# Patient Record
Sex: MALE | Race: WHITE | NOT HISPANIC OR LATINO | URBAN - METROPOLITAN AREA
[De-identification: names, ages, dates, MRNs, and addresses within clinical notes are randomized per-mention and may not be internally consistent; named-entity substitution may affect disease eponyms.]

---

## 2022-05-14 ENCOUNTER — HOSPITAL ENCOUNTER (EMERGENCY)
Facility: OTHER | Age: 32
Discharge: HOME | End: 2022-05-14
Attending: EMERGENCY MEDICINE
Payer: COMMERCIAL

## 2022-05-14 VITALS
OXYGEN SATURATION: 99 % | WEIGHT: 200 LBS | TEMPERATURE: 99 F | BODY MASS INDEX: 28.63 KG/M2 | HEIGHT: 70 IN | HEART RATE: 85 BPM | RESPIRATION RATE: 18 BRPM | SYSTOLIC BLOOD PRESSURE: 139 MMHG | DIASTOLIC BLOOD PRESSURE: 80 MMHG

## 2022-05-14 DIAGNOSIS — S00.81XA ABRASION OF FACE, INITIAL ENCOUNTER: Primary | ICD-10-CM

## 2022-05-14 DIAGNOSIS — S00.511A ABRASION OF LIP, INITIAL ENCOUNTER: ICD-10-CM

## 2022-05-14 DIAGNOSIS — S03.2XXA TOOTH AVULSION, INITIAL ENCOUNTER: ICD-10-CM

## 2022-05-14 PROCEDURE — 99285 EMERGENCY DEPT VISIT HI MDM: CPT | Mod: 25

## 2022-05-14 PROCEDURE — 64400 NJX AA&/STRD TRIGEMINAL NRV: CPT | Mod: RT

## 2022-05-14 RX ORDER — KETOROLAC TROMETHAMINE 10 MG/1
10 TABLET, FILM COATED ORAL EVERY 6 HOURS
Qty: 12 TABLET | Refills: 0 | Status: SHIPPED | OUTPATIENT
Start: 2022-05-14 | End: 2022-05-17

## 2022-05-14 RX ORDER — PENICILLIN V POTASSIUM 500 MG/1
500 TABLET, FILM COATED ORAL 4 TIMES DAILY
Qty: 28 TABLET | Refills: 0 | Status: SHIPPED | OUTPATIENT
Start: 2022-05-14 | End: 2022-05-21

## 2022-05-14 NOTE — Clinical Note
"Rylan Calzada" Domingo was seen and treated in our emergency department on 5/14/2022.  He may return to work on 05/16/2022.       If you have any questions or concerns, please don't hesitate to call.      JI Campbell"
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
